# Patient Record
Sex: FEMALE | Race: OTHER
[De-identification: names, ages, dates, MRNs, and addresses within clinical notes are randomized per-mention and may not be internally consistent; named-entity substitution may affect disease eponyms.]

---

## 2020-11-29 ENCOUNTER — HOSPITAL ENCOUNTER (EMERGENCY)
Dept: HOSPITAL 61 - ER | Age: 25
Discharge: HOME | End: 2020-11-29
Payer: COMMERCIAL

## 2020-11-29 VITALS — HEIGHT: 63 IN | BODY MASS INDEX: 35.16 KG/M2 | WEIGHT: 198.42 LBS

## 2020-11-29 VITALS — SYSTOLIC BLOOD PRESSURE: 136 MMHG | DIASTOLIC BLOOD PRESSURE: 64 MMHG

## 2020-11-29 DIAGNOSIS — M25.572: Primary | ICD-10-CM

## 2020-11-29 PROCEDURE — 99283 EMERGENCY DEPT VISIT LOW MDM: CPT

## 2020-11-29 PROCEDURE — 73610 X-RAY EXAM OF ANKLE: CPT

## 2020-11-29 NOTE — RAD
ANKLE LEFT 3V

 

Clinical indications: Reason: Left l ankle pain and swelling, pt denies 

injury or trauma /

 

Findings:  No acute fracture or dislocation or osteolytic process is 

evident. The mortise ankle joint is intact.

 

Impression:  No acute osseous abnormality is evident.

 

Electronically signed by: Bari Lucia MD (11/29/2020 12:20 PM) 

[id number]

## 2020-11-29 NOTE — ED.ADGEN
Past Medical History


Past Medical History:  No Pertinent History


Past Surgical History:  No Surgical History


Smoking Status:  Never Smoker


Alcohol Use:  None





General Adult


EDM:


Chief Complaint:  ANKLE PROBLEM





HPI:


HPI:





Patient is a 25  year old female who presents to the emergency room with 

complaints of left ankle pain.  She denies any known injury.  She states that a 

week ago her ankle was bothering her but then it improved.  She woke up again 

today with pain and a mild swelling in her left ankle.  Patient denies any 

numbness, tingling, or weakness of the affected extremity.  She states that her 

ankle feels swollen today.  She currently rates her pain a 5 out of 10 on the 

pain scale, she denies any alleviating factors, the pain is worse with movement.





Review of Systems:


Review of Systems:


Complete ROS is negative unless otherwise noted in HPI.





Allergies:


Allergies:





Allergies








Coded Allergies Type Severity Reaction Last Updated Verified


 


  No Known Drug Allergies    11/29/20 No











Physical Exam:


PE:


See Above


Constitutional: Well developed, well nourished, no acute distress, non-toxic 

appearance. []


HENT: Normocephalic, atraumatic, bilateral external ears normal, nose normal. []


Eyes: PERRLA, EOMI, conjunctiva normal, no discharge. [] 


Neck: Normal range of motion, no stridor. [] 


Cardiovascular:Heart rate regular rhythm


Lungs & Thorax:  Respirations even and unlabored, no retractions, no respiratory

 distress


Skin: Warm, dry, no erythema, no rash. [] 


Extremities: Left ankle: Lateral and medial TTP, no crepitus, no obvious 

deformity, no cyanosis, ROM intact, no edema. [] 


Neurologic: Alert and oriented X 3, no focal deficits noted. []


Psychologic: Affect normal, judgement normal, mood normal. []





Current Patient Data:


Vital Signs:





                                   Vital Signs








  Date Time  Temp Pulse Resp B/P (MAP) Pulse Ox O2 Delivery O2 Flow Rate FiO2


 


11/29/20 11:35 98.5 75 15 136/64 (88) 98 Room Air  





 98.5       











EKG:


EKG:


[]





Heart Score:


Risk Factors:


Risk Factors:  DM, Current or recent (<one month) smoker, HTN, HLP, family 

history of CAD, obesity.


Risk Scores:


Score 0 - 3:  2.5% MACE over next 6 weeks - Discharge Home


Score 4 - 6:  20.3% MACE over next 6 weeks - Admit for Clinical Observation


Score 7 - 10:  72.7% MACE over next 6 weeks - Early Invasive Strategies





Radiology/Procedures:


Radiology/Procedures:


PROCEDURE: ANKLE LEFT 3V





ANKLE LEFT 3V


 


Clinical indications: Reason: Left l ankle pain and swelling, pt denies 


injury or trauma /


 


Findings:  No acute fracture or dislocation or osteolytic process is 


evident. The mortise ankle joint is intact.


 


Impression:  No acute osseous abnormality is evident.


 []





Course & Med Decision Making:


Course & Med Decision Making


Pertinent Labs and Imaging studies reviewed. (See chart for details)





[]





Dragon Disclaimer:


Dragon Disclaimer:


This electronic medical record was generated, in whole or in part, using a voice

 recognition dictation system.





Departure


Departure


Impression:  


   Primary Impression:  


   Acute left ankle pain


Disposition:  01 DC HOME SELF CARE/HOMELESS


Condition:  STABLE


Referrals:  


VIDA CORRIGAN MD


Patient Instructions:  Ankle Pain





Additional Instructions:  


You can take Tylenol or ibuprofen as needed for pain.  Recommend application of 

ice, elevation, and rest of affected extremity. Wear the wrap that was placed as

 needed for comfort.  Follow-up with  if symptoms persist.  Return to the

 ER if your symptoms worsen.











CARLOTA WHELAN APRN       Nov 29, 2020 12:41